# Patient Record
(demographics unavailable — no encounter records)

---

## 2024-12-09 NOTE — PHYSICAL EXAM
[No Acute Distress] : no acute distress [Well Nourished] : well nourished [Well Developed] : well developed [Well-Appearing] : well-appearing [EOMI] : extraocular movements intact [Normal Outer Ear/Nose] : the outer ears and nose were normal in appearance [No JVD] : no jugular venous distention [No Respiratory Distress] : no respiratory distress  [No Accessory Muscle Use] : no accessory muscle use [Normal Rate] : normal rate  [Regular Rhythm] : with a regular rhythm [Normal S1, S2] : normal S1 and S2 [No Edema] : there was no peripheral edema [Non-distended] : non-distended [No CVA Tenderness] : no CVA  tenderness [Grossly Normal Strength/Tone] : grossly normal strength/tone [No Rash] : no rash [Coordination Grossly Intact] : coordination grossly intact [No Focal Deficits] : no focal deficits [Normal Gait] : normal gait [Normal Affect] : the affect was normal [Normal Mood] : the mood was normal [Normal Insight/Judgement] : insight and judgment were intact [de-identified] : +ve coarse BS B/L

## 2024-12-09 NOTE — PLAN
[FreeTextEntry1] : cont Breo 200/25 1 inh qd  cont Albuterol HFA q 4-6h, prn   ADD Montelukast 10mg hs  ADD Prednisone (see med orders) see med orders   pt advised to cancel scheduled EGD tomorrow 12/10/24 2n2 Asthma  exacerbation

## 2024-12-09 NOTE — HISTORY OF PRESENT ILLNESS
[FreeTextEntry8] : Pt. c/o Asthma/ fatigue going on for 2 days.   as above x 2 days    +ve increased cough and  increased SOB  over the last 2 days.  Denies fever/chills   +ve chest and back discomfort    +ve disruption of sleep  mild cough rarely productive c clear to white phlegm   no N/V/D    mild decreased appetite   AS above despite daily Breo 1 inh qd and Albuterol MDI prn

## 2025-01-29 NOTE — ASSESSMENT
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 Asthma that appears controlled at this time.   No pulmonary contraindications to planned procedure. Higher than average risk for complications from sedation given severe VINI.

## 2025-01-29 NOTE — PHYSICAL EXAM
[No Acute Distress] : no acute distress [Low Lying Soft Palate] : low lying soft palate [Elongated Uvula] : elongated uvula [Enlarged Base of the Tongue] : enlarged base of the tongue [III] : Mallampati Class: III [Supple] : supple [Normal Rate/Rhythm] : normal rate/rhythm [Normal S1, S2] : normal s1, s2 [No Resp Distress] : no resp distress [No Acc Muscle Use] : no acc muscle use [Normal Rhythm and Effort] : normal rhythm and effort [Clear to Auscultation Bilaterally] : clear to auscultation bilaterally [Normal Color/ Pigmentation] : normal color/ pigmentation [Oriented x3] : oriented x3 [Normal Mood] : normal mood [Normal Affect] : normal affect

## 2025-01-29 NOTE — HISTORY OF PRESENT ILLNESS
[TextBox_4] : Hx asthma, VINI.  Normally sees Dr Jamison.   Plans on colonoscopy on 2/13/25 with Dr Cowart here at [address] Was scheduled last month but had to be cancelled due to asthma exacerbation.  Feels well now. Taking breo daily. No sob, wheeze, cough.   [place] done today 1/29/25 is normal. No change c/w 9/30/24.  Hx of severe VINI. Ordered for autoPAP but when they called they mentioned may be some cost so she did not accept it.   Quit smoking in 1987. [TextBox_100] : 5/28/24 [TextBox_108] : 46.1 [TextBox_112] : 14

## 2025-01-29 NOTE — PLAN
[TextEntry] : Must take breo daily. Albuterol prn; used pre and postop.  Her APAP or empiric CPAP at 10 cm of water should be available to use during and after the procedures as needed. Post op incentive spirometry. Would recommend that O2 saturation should be monitored during and after the procedure. Would be preferable to do the procedure in a hospital setting given the severe VINI.   Going forward, I will ask staff to resend Rx for CPAP as she has agreed to accept it. Gave sample of Solo nasal pillows mask to try in addition to the one ordered.   F/U with Dr Jamison.  Message sent to Dr Cowart re: above reccs.

## 2025-01-29 NOTE — CONSULT LETTER
[Dear  ___] : Dear  [unfilled], [Consult Letter:] : I had the pleasure of evaluating your patient, [unfilled]. [Consult Closing:] : Thank you very much for allowing me to participate in the care of this patient.  If you have any questions, please do not hesitate to contact me. [DrJeremias  ___] : Dr. FLEMING

## 2025-01-29 NOTE — END OF VISIT
[Time Spent: ___ minutes] : I have spent [unfilled] minutes of time on the encounter which excludes teaching and separately reported services. [FreeTextEntry3] : new pt to me, reviewing sleep study, trt for VINI, risks of untreated VINI, reviewing kylah and trt for asthma, communicating with referring doctor

## 2025-02-03 NOTE — ASSESSMENT
[FreeTextEntry1] : Symptoms likely from OA/ degenerative disease  - labs and imaging as below - conservative treatment of the patient's condition- including rest, ice, heat, anti-inflammatory medications, activity modifications, home stretching and strengthening exercises daily. - PT  Discussed treatment plan with the patient. The patient was given the opportunity to ask questions and all questions were answered to their satisfaction.

## 2025-02-03 NOTE — PHYSICAL EXAM
[General Appearance - Alert] : alert [General Appearance - In No Acute Distress] : in no acute distress [General Appearance - Well Nourished] : well nourished [General Appearance - Well Developed] : well developed [Sclera] : the sclera and conjunctiva were normal [Outer Ear] : the ears and nose were normal in appearance [Neck Appearance] : the appearance of the neck was normal [Musculoskeletal - Swelling] : no joint swelling seen [] : no rash [No Focal Deficits] : no focal deficits [Oriented To Time, Place, And Person] : oriented to person, place, and time [FreeTextEntry1] : Right knee crepitus

## 2025-02-03 NOTE — HISTORY OF PRESENT ILLNESS
[FreeTextEntry1] : 68 year old female with PMH as listed below presents today for an initial evaluation for joint pain  - Reports chronic long standing hx of joint pain to multiple joints- low back, BL shoulders, BL hips, BL knees - Seen by pain management for L spine ddd, herniated discs > 6 years ago - Treated with CSI with some improvement in symptoms and then lost to follow up as her symptoms were mild - Long standing hx of multiple trigger fingers, s/p CSI in the past, BL CT surgery with improvement in symptoms - Over the last 1-2 years noted recurrence of trigger finger to multiple digitis - Has a hard time making a fist in AM. +morning stiffness 30 mins+  - Knee pain: onset 3 years ago, worse to R>L knee. mild swelling present at times. + mild locking/ giving out of the knee - Difficulty with stairs, requires handrail support - Had near fall 3 months ago. Grabbed on to hand rail and since then reports R shoulder pain, R groin pain - Currently using OTC agents/ topical diclofenac PRN with improvement in symptoms  Social History: - Daughter lives nearby - Previously active with dancing  FH: denies FH of RA  Labs. chart reviewed

## 2025-03-04 NOTE — PHYSICAL EXAM
[Normal] : affect was normal and insight and judgment were intact [de-identified] : bilateral erythema, irregular flat patches, nontender, no warmth

## 2025-03-04 NOTE — ASSESSMENT
[FreeTextEntry1] : Rash: suspect fungal etiology, start clotrimazole cream 1% bid x 14 days, refer to dermatology T2DM: a1c 9/24 reviewed, Recommend low carb diet, wt loss, exercise, f/u a1c ordered, f/u ophthalmology, c/w metformin 500 mg po daily HLD: lipid profile 9/24 reviewed, Recommend low fat diet, wt loss, exercise, and DASH diet, f/u lipid profile ordered, c/w rosuvastatin 10 mg po daily RTC 1 wk

## 2025-03-04 NOTE — HISTORY OF PRESENT ILLNESS
[FreeTextEntry8] : 69 yo female presents for follow up of HLD, T2DM. reports concern for rash under breasts, it is burning, has been going on for 2 wks. Denies fever, chills, cp, palpitations, sob, nvcd.

## 2025-03-05 NOTE — ASSESSMENT
[FreeTextEntry1] : L spine ddd, herniated discs  Polyarthralgia's- stable  - pain management follow up - conservative treatment of the patient's condition- including rest, ice, heat, anti-inflammatory medications, activity modifications, home stretching and strengthening exercises daily. - PT  Discussed treatment plan with the patient. The patient was given the opportunity to ask questions and all questions were answered to their satisfaction.

## 2025-03-05 NOTE — HISTORY OF PRESENT ILLNESS
[FreeTextEntry1] : Pt presenting today for a f.u visit for joint pain Recent labs from 02/2025 reviewed with pt in detail- +cry fibrinogen, mild elevation in esr/ crp Imaging studies reviewed with pt- mild arthritis changes ROS otherwise unchanged from LV.  Had extensive discussion with pt today about treatment options. At this time the decision was made to start treatment with PT, conservative treatment.

## 2025-03-12 NOTE — ASSESSMENT
[FreeTextEntry1] : Rash: improved with clotrimazole, refill clotrimazole 1% cream bid x 14 days, start hydrocortisone 1% cream bid x 14 days, refer to dermatology HTN: bp elevated, may be related to shoulder pain, will ct monitior, recommend low salt diet, wt loss, exercise, DASH diet, c/w amlodipine 10 mg po daily, losartan 100 mg po daily Right shoulder pain: xray shoulder 2/25 reviewed, nonspecific punctate mineralization, refer to orthopedist, start naproxen 500 mg po bid prn for pain RTC 2 wks

## 2025-03-12 NOTE — PHYSICAL EXAM
[Normal] : affect was normal and insight and judgment were intact [de-identified] : right shoulder ROM/motor strength limited 2/2 pain, sensation intact [de-identified] : right breast very mild erythema, 1x1 cm area, no warmth/nontender, left breast multiple patches erythema, flat, no warmth/nontender [de-identified] : see msk

## 2025-03-12 NOTE — HISTORY OF PRESENT ILLNESS
[FreeTextEntry8] : 67 yo female presents for follow up of rash under breasts, HTN, right shoulder pain. She has been using antifungal cream for breast rash. she says its significantly improved under the right breast, the left breast still has rash. She notes that her bp has been elevated. also notes right shoulder pain since December, pain is 10/10 in severity, she was seen by rheumatology who recommended Tylenol. Denies fever, chills, cp, palpitations, sob, nvcd.

## 2025-03-17 NOTE — DISCUSSION/SUMMARY
[de-identified] : Assessment: 60-year-old female with right shoulder pain secondary calcific tendinitis  Plan: I had a long discussion with the patient today regarding the nature of their diagnosis and treatment plan. We discussed the risks and benefits of no treatment as well as nonoperative and operative treatments.  I reviewed the patient's x-rays today with her in the office which do demonstrate calcific tendinitis.  On examination she has reasonable good strength resistance but does have pain to palpation of the shoulder and pain with range of motion.  At this time I recommend conservative treatment of the patient's condition with modalities including rest, ice, heat, anti-inflammatory medications, activity modifications, and home stretching and strengthening exercises.  A prescription for Voltaren gel was sent to the patient's pharmacy today.  I discussed with the patient the risks and benefits associated with NSAID use. GI precautions were discussed.  A referral for physical therapy was provided to begin working on exercises to help improve their strength and function.  A cortisone injection was administered to the patient's right shoulder subacromial space today.  The patient tolerated this well and there were no adverse effects.  She will follow-up in 8 weeks repeat clinical assessment as needed.  Patient was also referred to a hand specialist for consideration of a trigger point injection to her left hand.   The patient verbalizes their understanding and agrees with the plan.  All questions were answered to their satisfaction.   I, Dr. Bhardwaj, personally performed the evaluation and management (E/M) services for this new patient.  That E/M includes conducting the clinically appropriate initial history &/or exam, assessing all conditions, and establishing the plan of care.  Today, my LUCRECIA, was here to observe my evaluation and management service for this patient & follow plan of care established by me going forward.

## 2025-03-17 NOTE — PROCEDURE
[de-identified] : I injected the patient's right shoulder today with cortisone.  I discussed at length with the patient the planned steroid and lidocaine injection. The risks, benefits, convalescence and alternatives were reviewed. The possible side effects discussed included but were not limited to: pain, swelling, heat, bleeding, and redness. Symptoms are generally mild but if they are extensive then contact the office. Giving pain relievers by mouth such as NSAIDs or Tylenol can generally treat the reactions to steroid and lidocaine. Rare cases of infection have been noted. Rash, hives and itching may occur post injection. If you have muscle pain or cramps, flushing and or swelling of the face, rapid heart beat, nausea, dizziness, fever, chills, headache, difficulty breathing, swelling in the arms or legs, or have a prickly feeling of your skin, contact a health care provider immediately. Following this discussion, the shoulder was prepped with Chlorhexidine and Alcohol and under sterile conditions the 80 mg Depo-Medrol and 4 cc Lidocaine injection was performed with a 22 gauge needle through a posterolateral injection site. The needle was introduced into the subacromial space and the medication was injected. Upon withdrawal of the needle the site was cleaned with alcohol and a band aid was applied. The patient tolerated the injection well and there were no adverse effects. Post injection instructions included no strenuous activity for 24 hours, cryotherapy and if there are any adverse effects to contact the office.

## 2025-03-17 NOTE — PHYSICAL EXAM
[de-identified] : General: Awake, alert, no acute distress, Patient was cooperative and appropriate during the examination.  Walks without an antalgic gait.   Right shoulder Exam: Physical exam of the shoulder demonstrates normal skin without signs of skin changes or abnormalities. No erythema, warmth, or joint effusion appreciated.  Sensation intact light touch C5-T1 Palpable radial pulse Radial/ulnar/median/axillary/musculocutaneous/AIN/PIN nerves grossly intact  Range of motion: Forward Flexion: 160 limited by pain Abduction: 100 limited by pain External Rotation: 45 with pain Internal Rotation: Beltline with pain  Palpation: Not tender to palpation over the glenohumeral joint Exquisitely tender palpation over the rotator cuff insertion on the greater tuberosity Not tender to palpation over the AC joint Mildly tender to palpation of the biceps tendon/bicipital groove  Strength testing: Supraspinatus: 5/5 Infraspinatus: 5/5 Subscapularis: 5/5  Special test: Empty can test positive [place] impingement test positive Speeds test negative [place]'s test negative Lift-off test negative Bell-press test negative Cross-arm adduction test negative   [de-identified] : X-rays 3 views of the right shoulder taken at a [facility] imaging facility  in February 2025 shows no acute fracture or dislocation with a small calcific deposit over the tuberosity of the humerus indicative of calcific tendinitis

## 2025-03-17 NOTE — HISTORY OF PRESENT ILLNESS
[de-identified] : 03/17/2025 : VINCENT ROBERTO  is a 68 year  old female who presents to the office for evaluation of her right arm.  She states for several weeks now without any injury she has had a lot of pain about her right shoulder and arm that radiates into her neck and shoulder blade and down the arm and into the hand that is worse with certain activities and motions and alleviated with rest.  She states the pain can be sharp and interfering and she was given naproxen and Tylenol from her primary care physician which are not giving her adequate relief.  She had an x-ray and was told to follow-up with the specialist.  She is here for specialist opinion because of her severe pain.  She states she is unable to sleep or perform activities of daily living because of the severe pain.  She denies any numbness or tingling distally.  She has no other complaints today.

## 2025-03-18 NOTE — HISTORY OF PRESENT ILLNESS
[FreeTextEntry1] : Debi is a pleasant 68-year-old female presenting today with a chronic history of bilateral wrist and hand discomfort.  Patient describes burning and numbness and tingling.  Patient describes stiffness in the fingers as well.  This inhibits ADLs and sleep

## 2025-03-18 NOTE — ASSESSMENT
[FreeTextEntry1] : ASSESSMENT: The patient comes in today with chronic exacerbated complaints of bilateral wrist and hand discomfort in the form of carpal tunnel disease.  We have discussed tendinopathy in the hands as well.  We have discussed treatment options thoroughly including bracing and activity modification oral medications.  Today she does elect for injections   The patient was adequately and thoroughly informed of my assessment of their current condition(s).  - This may diminish bodily function for the extremity. We discussed prognosis, tx modalities including operative and nonoperative options for the above diagnostic assessment. As always, 2nd opinion is always provided as an option.  When accessible, I was able to review other physicians note(s) including reviewing other tests, imaging results as well as personally view these results for my own interpretation.   Injection:   The risks and benefits of a steroid injection were discussed in detail. The risks include but are not limited to: pain, infection, swelling, flare response, bleeding, subcutaneous fat atrophy, skin depigmentation and/or elevation of blood sugar. The risk of incomplete resolution of symptoms, recurrence and additional intervention was reviewed and considered by the patient. The patient agreed to proceed and under a sterile prep, I injected 1 unit 6mg into 1 cc of a combination of Celestone and Lidocaine into the bilateral carpal tunnel, left index trigger. The patient tolerated the injection well.  The patient was adequately and thoroughly informed of my assessment of their current condition(s).  DISCUSSION: 1.  Injections as above.  Bracing activity modification.  Follow-up 6 weeks 2. [x] 3. [x]

## 2025-03-18 NOTE — PHYSICAL EXAM
[de-identified] : Exam [bilateral] wrist + Durkan's with + N/T. There is + tinel. Patient is able to delineate numbness to median-sided digits volarly. [No obvious thenar atrophy] Examination of the hand(s)  particularly at the A1 of the left index reveals tenderness with a palpable click. [de-identified] : [4] views of [bilateral hands and wrists] were obtained today in my office and were seen by me and discussed with the patient.  These [show findings consistent with bilateral basal joint OA and findings of IP joint OA]

## 2025-03-27 NOTE — HISTORY OF PRESENT ILLNESS
[de-identified] : 2019. Moderate gastritis.   [FreeTextEntry1] : 2019. Diverticulosis and hemorrhoids. 2014. Colon polyp.

## 2025-03-27 NOTE — PHYSICAL EXAM
[Alert] : alert [Normal Voice/Communication] : normal voice/communication [Healthy Appearing] : healthy appearing [No Acute Distress] : no acute distress [Sclera] : the sclera and conjunctiva were normal [Hearing Threshold Finger Rub Not [place]] : hearing was normal [Normal Appearance] : the appearance of the neck was normal [No Respiratory Distress] : no respiratory distress [No Acc Muscle Use] : no accessory muscle use [Respiration, Rhythm And Depth] : normal respiratory rhythm and effort [Heart Rate And Rhythm] : heart rate was normal and rhythm regular [Abdomen Tenderness] : non-tender [No Masses] : no abdominal mass palpated [Abdomen Soft] : soft [Abnormal Walk] : normal gait [Normal Color / Pigmentation] : normal skin color and pigmentation [No Focal Deficits] : no focal deficits [Oriented To Time, Place, And Person] : oriented to person, place, and time

## 2025-04-02 NOTE — CONSULT LETTER
[Dear  ___] : Dear  [unfilled], [Consult Letter:] : I had the pleasure of evaluating your patient, [unfilled]. [Consult Closing:] : Thank you very much for allowing me to participate in the care of this patient.  If you have any questions, please do not hesitate to contact me. [Sincerely,] : Sincerely, [FreeTextEntry2] : Chanel Hayes MD [FreeTextEntry1] : She has several benign seborrheic keratoses on both lower breast.  No treatment is needed for these.  She also has a mild intertrigo in the left mid inframammary crease.  I am treating this with 2-1/2% hydrocortisone ointment 2-3 times a day as needed.  Please see attached chart note for further details. [FreeTextEntry3] : Pro Mcdaniel MD [address] [facility] [address] [place] [address] Tel ([phone]) Fax ([phone]) Private line ([phone])

## 2025-04-02 NOTE — HISTORY OF PRESENT ILLNESS
[FreeTextEntry1] : Rash under breast [de-identified] : Follow-up visit for 68-year-old  female last seen by me on January 10, 2023, referred today by Chanel Hayes MD with a history of a rash  under the breasts.  Previously treated with clotrimazole cream and later 1% hydrocortisone cream and nystatin powder. Patient now concerned about "brown spots that persist."  Note: Patient has diabetes.

## 2025-04-02 NOTE — PLAN
[TextEntry] : Reassurance about the seborrheic keratoses Start 2-1/2% hydrocortisone ointment to inframammary fold twice a day as needed  Return as needed-if not improving

## 2025-04-02 NOTE — ASSESSMENT
[Use of independent historian: [ enter independent historian's relationship to patient ] :____] : As the patient was unable to provide a complete and reliable history, I obtained clinical history from the patient's [unfilled] [FreeTextEntry1] : Focal intertrigo under left breast Probable prior candidiasis which responded to treatment. Seborrheic keratoses on lower breasts

## 2025-04-02 NOTE — PHYSICAL EXAM
[Alert] : alert [Oriented x 3] : ~L oriented x 3 [Well Nourished] : well nourished [FreeTextEntry3] : Lower breast: Several large thin brown verrucous plaques Left inframammary crease: Focal mild erythema and scaling with slight erosion  Note: Photo on patient's phone shows several erythematous papules under one of the breasts

## 2025-05-05 NOTE — HISTORY OF PRESENT ILLNESS
[FreeTextEntry1] : Ms. VINCENT ROBERTO  is a 69 year old woman , who presents for EGD for mapping biopsies due to recent EGD pathology results showing extensive intestinal metaplasia.   ---- [de-identified] : 3/27/25: EGD Impressions: Nodule in the gastroesophageal junction. (Biopsy). Erythema in the stomach compatible with gastritis. (Biopsy). Normal mucosa in the whole examined duodenum. (Biopsy).  1.  Duodenum, biopsy:  -   Small intestinal mucosa with no significant histopathology.  2.  Gastric, random, biopsy: -   Active chronic gastritis with extensive intestinal metaplasia. -   No Helicobacter pylori organisms are identified with Helicobacter pylori antibody immunohistochemical stain. -   See note.  3.  GE junction, biopsy: -   Gastro-esophageal mucosa with chronic inflammation. -   Negative for intestinal metaplasia.  ---

## 2025-05-05 NOTE — PHYSICAL EXAM
[Alert] : alert [Normal Voice/Communication] : normal voice/communication [Healthy Appearing] : healthy appearing [No Acute Distress] : no acute distress [Sclera] : the sclera and conjunctiva were normal [Hearing Threshold Finger Rub Not [place]] : hearing was normal [Normal Appearance] : the appearance of the neck was normal [No Respiratory Distress] : no respiratory distress [No Acc Muscle Use] : no accessory muscle use [Respiration, Rhythm And Depth] : normal respiratory rhythm and effort [Heart Rate And Rhythm] : heart rate was normal and rhythm regular [Abdomen Tenderness] : non-tender [No Masses] : no abdominal mass palpated [Abdomen Soft] : soft [Abnormal Walk] : normal gait [Normal Color / Pigmentation] : normal skin color and pigmentation [No Focal Deficits] : no focal deficits [Oriented To Time, Place, And Person] : oriented to person, place, and time

## 2025-05-21 NOTE — HEALTH RISK ASSESSMENT
[Good] : ~his/her~  mood as  good [Yes] : Yes [Monthly or less (1 pt)] : Monthly or less (1 point) [1 or 2 (0 pts)] : 1 or 2 (0 points) [Never (0 pts)] : Never (0 points) [No] : In the past 12 months have you used drugs other than those required for medical reasons? No [No falls in past year] : Patient reported no falls in the past year [0] : 1) Little interest or pleasure doing things: Not at all (0) [1] : 2) Feeling down, depressed, or hopeless for several days (1) [PHQ-2 Negative - No further assessment needed] : PHQ-2 Negative - No further assessment needed [Former] : Former [0-4] : 0-4 [Patient reported mammogram was normal] : Patient reported mammogram was normal [Patient reported PAP Smear was normal] : Patient reported PAP Smear was normal [Patient reported bone density results were abnormal] : Patient reported bone density results were abnormal [Patient reported colonoscopy was abnormal] : Patient reported colonoscopy was abnormal [HIV test declined] : HIV test declined [Hepatitis C test declined] : Hepatitis C test declined [With Family] : lives with family [Retired] : retired [] :  [Feels Safe at Home] : Feels safe at home [Fully functional (bathing, dressing, toileting, transferring, walking, feeding)] : Fully functional (bathing, dressing, toileting, transferring, walking, feeding) [Fully functional (using the telephone, shopping, preparing meals, housekeeping, doing laundry, using] : Fully functional and needs no help or supervision to perform IADLs (using the telephone, shopping, preparing meals, housekeeping, doing laundry, using transportation, managing medications and managing finances) [With Patient/Caregiver] : , with patient/caregiver [Reviewed no changes] : Reviewed, no changes [Designated Healthcare Proxy] : Designated healthcare proxy [Name: ___] : Health Care Proxy's Name: [unfilled]  [Relationship: ___] : Relationship: [unfilled] [Aggressive treatment] : aggressive treatment [Audit-CScore] : 1 [de-identified] : needs improvement [de-identified] : needs improvement [[id number]] : 1 [de-identified] : quit 42 years ago, smoked intermittent 5 years [Language] : denies difficulty with language [Behavior] : denies difficulty with behavior [Learning/Retaining New Information] : denies difficulty learning/retaining new information [Handling Complex Tasks] : denies difficulty handling complex tasks [Reasoning] : denies difficulty with reasoning [Spatial Ability and Orientation] : denies difficulty with spatial ability and orientation [Sexually Active] : not sexually active [High Risk Behavior] : no high risk behavior [Reports changes in hearing] : Reports no changes in hearing [Reports changes in vision] : Reports no changes in vision [Reports changes in dental health] : Reports no changes in dental health [MammogramDate] : 07/24 [MammogramComments] : birads 2 [PapSmearDate] : 08/23 [PapSmearComments] : NILM [BoneDensityDate] : 08/22 [BoneDensityComments] : osteopenia [ColonoscopyDate] : 03/25 [ColonoscopyComments] : colon polyp [AdvancecareDate] : 05/25

## 2025-05-21 NOTE — ASSESSMENT
[Vaccines Reviewed] : Immunizations reviewed today. Please see immunization details in the vaccine log within the immunization flowsheet.  [FreeTextEntry1] : Annual phsyical: f/u routine labwork Abnormal KODAK/EKG: f/u cardiology Anxiety/depression: no suicidal/homicidal ideation, recommend exercise, yoga, meditation Bladder wall thickening: f/u urology Retinopathy/cataract: f/u ophthalmology Hyperparathyroid: f/u endocrinology Asthma: well controlled, c/w albuterol hfa prn for sob/wheezing, advised to go to ER if condition is not resolved with hfa HLD: Recommend low fat diet, wt loss, exercise, nutritional counseling provided, f/u lipid panel ordered, c/w rosuvastatin 10 mg po daily Colon polyps: f/u GI for repeat colonoscopy HTN: bp mildly elevated, will attempt diet/lifestyle measures, may be related to current illness, recommend low salt diet, wt loss, exercise, DASH diet, c/w current regimen Intestinal metaplasia/Lipoma colon: f/u GI Obesity: Recommend low fat diet, wt loss, exercise, and DASH diet. Osteopenia: f/u dexa VINI: f/u pulmonology Ovarian cyst: F/u GYN T2DM: Recommend low carb diet, wt loss, exercise, f/u a1c ordered, c/w metformin 500 mg po daily, f/u ophthalmology Acute URI: recommend supportive care, start tylenol prn for fever/pain, recommend increased hydration, call EMS if symptoms worsen or develop sob. Recommend hand hygiene, cover mouth when coughing, wash hands frequently, f/u covid19/viral panel ordered, start azithromycin 250 mg po as prescribed, start methylprednisolone pack RTC 2 wks

## 2025-05-21 NOTE — REVIEW OF SYSTEMS
[Cough] : cough [Negative] : Psychiatric [Shortness Of Breath] : no shortness of breath [Wheezing] : no wheezing [Dyspnea on Exertion] : no dyspnea on exertion

## 2025-05-21 NOTE — HISTORY OF PRESENT ILLNESS
[FreeTextEntry1] : cpe [de-identified] : 68 yo female presents for annual physical. reports concern for cough, productive with yellow sputum. she says it began on Friday afternoon and has persisted. Denies fever, chills, cp, palpitations, sob, nvcd.